# Patient Record
(demographics unavailable — no encounter records)

---

## 2024-10-23 NOTE — HISTORY OF PRESENT ILLNESS
[FreeTextEntry1] : RP brown spots [de-identified] : Ms. YASMINE TANG is a 68 year old F here for evaluation of below  Problem: brown spots on the body  Location: arms legs trunk Duration: present x years Associated symptoms: not itchy or painful Modifying/Aggravating factors: nothing makes it better or worse Treatments: No treatments tried.   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

## 2024-10-23 NOTE — ASSESSMENT
[FreeTextEntry1] : # Hx of SCC, 9 years ago, s/p excision- on the left proximal forearm  - NER - Counseled on yearly skin checks and sun protection   #SKs -Benign, reassurance -Can consider cosmetic removal with cryo, risks/benefits/OOP cost (200 up to 15) reviewed   # Multiple melanocytic nevi, all benign appearing on today's exam -Sun protection was discussed. The proper use of broad spectrum UVB/UVA sunscreen with SPF 30 or greater was reviewed and the need for re-application after swimming or sweating or 2-3 hours was emphasized. We discussed judicious use of clothing and avoidance of peak periods of sun exposure. I made the patient aware of need for year-round protection. ABCDEs of melanoma reviewed. -Self-skin check also reviewed -Counseled pt to monitor for changes   # Screening for skin cancer -ABCDEs of melanoma, sun safety, and self-skin check reviewed -Counseled pt to notify us of any changing or concerning lesions -RTC 12 months, sooner prn

## 2024-10-23 NOTE — PHYSICAL EXAM
[Alert] : alert [Oriented x 3] : ~L oriented x 3 [Well Nourished] : well nourished [Conjunctiva Non-injected] : conjunctiva non-injected [No Visual Lymphadenopathy] : no visual  lymphadenopathy [No Clubbing] : no clubbing [No Edema] : no edema [No Bromhidrosis] : no bromhidrosis [No Chromhidrosis] : no chromhidrosis [Full Body Skin Exam Performed] : performed [FreeTextEntry3] :  AAOx3, NAD, well-appearing / pleasant Total body skin exam performed within normal limits with the exception of:  - Patient deferred examination of genitalia - Fingernail & Toenail exam limited by opaque nail polish  Left forearm with well healed scar, NER Scattered well demarcated stuck on appearing brown plaques on the trunk and extremities. Fairly uniform and regular brown macules and papules on the trunk and extremities

## 2024-12-03 NOTE — PHYSICAL EXAM
[Alert] : alert [Oriented x 3] : ~L oriented x 3 [Well Nourished] : well nourished [Conjunctiva Non-injected] : conjunctiva non-injected [No Visual Lymphadenopathy] : no visual  lymphadenopathy [No Clubbing] : no clubbing [No Edema] : no edema [No Bromhidrosis] : no bromhidrosis [No Chromhidrosis] : no chromhidrosis [FreeTextEntry3] : Focused skin exam performed  The relevant portions of the exam were performed today  AAOx3, NAD, well-appearing / pleasant Focused examination within normal limits with the exception of:  - stuck on waxy brown papules scattered on L face

## 2024-12-03 NOTE — ASSESSMENT
[FreeTextEntry1] : #SKs x15 lesions tx today  -Benign, reassurance - Discuss sun protection  - discussed risk of hypo or hyperpigmentation and rare risk of scarring - ($200 up to 15)   Procedure note: Cryotherapy  Verbal consent obtained. Risks/benefits/alternatives discussed including but not limited to risk of pain, inflammatory and blistering reaction, infection, risk of permanent scar and/or dyspigmentation, recurrence, possible lack of efficacy and need for repeat treatments. Site confirmed. lesion(s) treated with cryotherapy: liquid nitrogen x 2 rapid freeze-slow thaw cycles to larger lesions and 1 cycle to smaller lesions. Discussed wound care instructions. Patient tolerated well with no immediate complications.   Cosmetic fee $200

## 2024-12-03 NOTE — HISTORY OF PRESENT ILLNESS
[FreeTextEntry1] : RP brown spots [de-identified] : Ms. 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